# Patient Record
Sex: FEMALE | Race: BLACK OR AFRICAN AMERICAN | NOT HISPANIC OR LATINO | Employment: OTHER | URBAN - METROPOLITAN AREA
[De-identification: names, ages, dates, MRNs, and addresses within clinical notes are randomized per-mention and may not be internally consistent; named-entity substitution may affect disease eponyms.]

---

## 2017-03-08 ENCOUNTER — HOSPITAL ENCOUNTER (EMERGENCY)
Facility: HOSPITAL | Age: 57
Discharge: HOME | End: 2017-03-08
Attending: EMERGENCY MEDICINE

## 2017-03-08 VITALS
BODY MASS INDEX: 21.16 KG/M2 | HEART RATE: 70 BPM | DIASTOLIC BLOOD PRESSURE: 70 MMHG | TEMPERATURE: 99 F | HEIGHT: 62 IN | SYSTOLIC BLOOD PRESSURE: 120 MMHG | OXYGEN SATURATION: 96 % | WEIGHT: 115 LBS | RESPIRATION RATE: 20 BRPM

## 2017-03-08 DIAGNOSIS — R05.9 COUGH: Primary | ICD-10-CM

## 2017-03-08 PROCEDURE — 99283 EMERGENCY DEPT VISIT LOW MDM: CPT

## 2017-03-08 RX ORDER — GLYCOPYRROLATE 1 MG/1
1 TABLET ORAL 3 TIMES DAILY
COMMUNITY

## 2017-03-08 RX ORDER — OXYCODONE AND ACETAMINOPHEN 5; 325 MG/1; MG/1
1 TABLET ORAL EVERY 4 HOURS PRN
COMMUNITY

## 2017-03-08 RX ORDER — FOLIC ACID 1 MG/1
1 TABLET ORAL DAILY
COMMUNITY

## 2017-03-08 RX ORDER — ASPIRIN 81 MG/1
81 TABLET ORAL DAILY
COMMUNITY

## 2017-03-08 RX ORDER — LEVOTHYROXINE SODIUM 25 UG/1
25 TABLET ORAL DAILY
COMMUNITY

## 2017-03-08 NOTE — ED AVS SNAPSHOT
[facility]  [address]  [place] [address]               Jodi MARTINEZ Moises   3/8/2017  9:31 PM   ED    Description:  Female : 1960   Department:  [facility]           Your Care was Coordinated By:     Provider Role From To    Jaxon Higuera MD Attending Provider [id number] --      Reason for Visit     Cough           Diagnoses this Visit        Comments    Cough    -  Primary       ED Disposition     ED Disposition Condition Comment    Discharge             To Do List           Follow-up Information     Follow up with Primary care. Schedule an appointment as soon as possible for a visit in 2 days.      [facility]     [facility] -  Assistance  Registered nurses in the [facility] provide clinical advisement, health education, appointment booking, and other advisory services.  Call for this free service at [phone].             Medications           Message regarding Medications     Verify the changes and/or additions to your medication regime listed below are the same as discussed with your clinician today.  If any of these changes or additions are incorrect, please notify your healthcare provider.             Verify that the below list of medications is an accurate representation of the medications you are currently taking.  If none reported, the list may be blank. If incorrect, please contact your healthcare provider. Carry this list with you in case of emergency.           Current Medications     aspirin (ECOTRIN) 81 MG EC tablet Take 81 mg by mouth once daily.    ENOXAPARIN SODIUM (LOVENOX SUBQ) Inject into the skin.    folic acid (FOLVITE) 1 MG tablet Take 1 mg by mouth once daily.    glycopyrrolate (ROBINUL) 1 mg Tab Take 1 mg by mouth 3 (three) times daily.    levothyroxine (SYNTHROID) 25 MCG tablet Take 25 mcg by mouth once daily.    oxycodone-acetaminophen (PERCOCET) 5-325 mg per tablet Take  "1 tablet by mouth every 4 (four) hours as needed for Pain.           Clinical Reference Information           Your Vitals Were     BP Pulse Temp Resp Height Weight    112/78 (BP Location: Left arm, Patient Position: Sitting) 78 99.1 °F (37.3 °C) (Oral) 18 5' 2" (1.575 m) 52.2 kg (115 lb)    SpO2 BMI             96% 21.03 kg/m2         Allergies as of 3/8/2017        Reactions    Penicillins Anaphylaxis      Immunizations Administered on Date of Encounter - 3/8/2017     None      ED Micro, Lab, POCT     None      ED Imaging Orders     Start Ordered       Status Ordering Provider    03/08/17 2142 03/08/17 2141  X-Ray Chest AP Portable  1 time imaging      Final result         Discharge Instructions         Return to the emergency Department with any worsening, no better, or any other concerns.    Airway Clearance: Coughing Techniques    Airway clearance techniques help to remove mucus from the airways. Clearing the airways helps to improve breathing and lessen the chance of infection. One method is controlled coughing. Be sure to also use any medicines before or after clearing your airways, as instructed by your healthcare provider. For example, some people use inhaled bronchodilators before clearing their airways.   Controlled coughing  · Sit on a chair with both feet on the floor.  · Take a slow, deep breath through your nose and hold for 2 counts.  · Lean forward slightly.  · Cough twice--2 short coughs.  · Relax for a few seconds.  Repeat the steps as needed.  The valladares technique  · Sit on a chair with both feet on the floor.  · Take a slow, deep breath through your nose and hold for 2 counts.  · To exhale, open your mouth and make a valladares sound in your throat. (This is the same way you might breathe to clean a pair of eyeglasses.)  · Valladares 2 to 3 times as you exhale.  · Relax for a few seconds.  Repeat the steps as needed.  Date Last Reviewed: 5/1/2016  © 3739-5688 [employer]. [address], [place] [address]. All rights reserved. This information is not intended as a substitute for professional medical care. Always follow your healthcare professional's instructions.          [facility] Sign-Up     Activating your [facility] account is as easy as 1-2-3!     1) Visit [email or web address], select Sign Up Now, enter this activation code and your date of birth, then select Next.  [email or web address]  Expires: 4/22/2017 10:04 PM      2) Create a username and password to use when you visit [facility] in the future and select a security question in case you lose your password and select Next.    3) Enter your e-mail address and click Sign Up!    Additional Information  If you have questions, please e-mail [email or web address] or call [phone] to talk to our [facility] staff. Remember, [facility] is NOT to be used for urgent needs. For medical emergencies, dial 911.          [facility] complies with applicable Federal civil rights laws and does not discriminate on the basis of race, color, national origin, age, disability, or sex.        Language Assistance Services     ATTENTION: Language assistance services are available, free of charge. Please call [phone].      ATENCIÓN: Si habla español, tiene a greenwood disposición servicios gratuitos de asistencia lingüística. Llame al [phone].     CHÚ Ý: N?u b?n nói Ti?ng Vi?t, có các d?ch v? h? tr? ngôn ng? mi?n phí dành cho b?n. G?i s? [phone].

## 2017-03-09 NOTE — DISCHARGE INSTRUCTIONS
Return to the emergency Department with any worsening, no better, or any other concerns.    Airway Clearance: Coughing Techniques    Airway clearance techniques help to remove mucus from the airways. Clearing the airways helps to improve breathing and lessen the chance of infection. One method is controlled coughing. Be sure to also use any medicines before or after clearing your airways, as instructed by your healthcare provider. For example, some people use inhaled bronchodilators before clearing their airways.   Controlled coughing  · Sit on a chair with both feet on the floor.  · Take a slow, deep breath through your nose and hold for 2 counts.  · Lean forward slightly.  · Cough twice--2 short coughs.  · Relax for a few seconds.  Repeat the steps as needed.  The valladares technique  · Sit on a chair with both feet on the floor.  · Take a slow, deep breath through your nose and hold for 2 counts.  · To exhale, open your mouth and make a valladares sound in your throat. (This is the same way you might breathe to clean a pair of eyeglasses.)  · Valladares 2 to 3 times as you exhale.  · Relax for a few seconds.  Repeat the steps as needed.  Date Last Reviewed: 5/1/2016  © 9544-0445 [employer]. [address] [place] [address], [place] [address]. All rights reserved. This information is not intended as a substitute for professional medical care. Always follow your healthcare professional's instructions.

## 2017-03-09 NOTE — ED PROVIDER NOTES
Encounter Date: 3/8/2017       History     Chief Complaint   Patient presents with    Cough     PAtient is not able to communicate verbally do to medical history.  Patient is visiting [facility], but originally is from [place].  Son wants to make sure she is ok to travel since she is having trouble coughing.  Son states mom had surgery done in feb in which a mass was removed and still has staples in place.     Review of patient's allergies indicates:   Allergen Reactions    Penicillins Anaphylaxis     HPI   56 y.o. female presents to ER brought in by her son with complaint of cough.    History was obtained from the patient's son as the patient is unable to provide a concise history due to history of stroke.    Patient had a fibroid tumor removed approximately one month ago.  Patient has been having a cough.  No fever.    Patient has been eating and drinking normally.  Bowel movement this morning which was normal.      Past Medical History:   Diagnosis Date    Anemia     Bronchitis     MI (myocardial infarction)     Stroke     Thyroid disease     TIA (transient ischemic attack)      Past Surgical History:   Procedure Laterality Date    ABDOMINAL SURGERY      pelvic mass       No family history on file.  Social History   Substance Use Topics    Smoking status: Unknown If Ever Smoked    Smokeless tobacco: None    Alcohol use None     Review of Systems  Unable to obtain review of systems directly from patient due to patient's history of aphasia     Physical Exam   Initial Vitals   BP Pulse Resp Temp SpO2   03/08/17 2129 03/08/17 2129 03/08/17 2129 03/08/17 2129 03/08/17 2129   112/78 78 18 99.1 °F (37.3 °C) 96 %     Physical Exam  Nurses notes reviewed and confirmed.  Constitutional: Vitals reviewed.    Vitals:    03/08/17 2129   BP: 112/78   Pulse: 78   Resp: 18   Temp: 99.1 °F (37.3 °C)    . No apparent distress   Head: Atraumatic. Normocephalic   Eyes: EOM are normal. Pupils are equal, round, and reactive to  light. Normal conjunctiva and lids   HENT: Mucous membranes moist, pharynx normal, external ears and nose normal in appearance, Hearing grossly normal, Clear rhinorrhea   Neck: Normal range of motion. Neck supple. No masses, trachea midline  Cardiovascular: Normal rate, regular rhythm and normal heart sounds. No bruit, 2+ brachial pulses equal, no edema   Pulmonary/Chest: normal respiratory effort, breath sounds normal. Chest not tender to palpation, breast and ribs symmetrical   Abdominal: Soft. There is no tenderness. Normal bowel sounds. Not Distended, exploratory laparotomy incision scar clean dry and intact with staples present  Musculoskeletal: Normal range of motion without pain of major joints. No clubbing or digits, no obvious effusions. Major joints grossly stable, no spinal midline TTP and no step offs or deformities noted.   Neurological: Eyes open, nodding yes and no appropriately, very limited movement of extremities  Skin: Skin is warm and dry. No evidence of rash or cellulitis   Lymphatics: no cervical or axillary lymphadenopathy    ED Course   Procedures  Labs Reviewed - No data to display       X-Rays:   Independently Interpreted Readings:   Other Readings:  Chest Xray Independently reviewed and interpreted by me, the ED physician, revealing no acute cardiopulmonary process.     patient has a benign exam.  Patient does not appear to be acutely ill.  No acute findings on chest x-ray.  Follow-up with surgeon and/or primary care.                    ED Course     Clinical Impression:   The encounter diagnosis was Cough.          Jaxon Higuera MD  03/08/17 8802

## 2017-03-09 NOTE — ED NOTES
Patient identifiers verified and correct for Jodi De Leon.    LOC: The patient is awake, alert and aware of environment with an appropriate affect  SKIN: The skin is warm and dry, color consistent with ethnicity, patient has normal skin turgor and moist mucus membranes  MUSCULOSKELETAL: Patient moving all extremities spontaneously, no obvious swelling or deformities noted.  RESPIRATORY: Airway is open and patent, respirations are spontaneous, patient has a normal effort and rate, no accessory muscle use noted, bilateral breath sounds CTA reports non productive cough   ABDOMEN: Soft and non tender to palpation, no distention noted, normoactive bowel sounds present in all four quadrants. abd binder noted